# Patient Record
Sex: FEMALE | Race: WHITE | NOT HISPANIC OR LATINO | Employment: FULL TIME | URBAN - METROPOLITAN AREA
[De-identification: names, ages, dates, MRNs, and addresses within clinical notes are randomized per-mention and may not be internally consistent; named-entity substitution may affect disease eponyms.]

---

## 2017-07-31 ENCOUNTER — ALLSCRIPTS OFFICE VISIT (OUTPATIENT)
Dept: OTHER | Facility: OTHER | Age: 27
End: 2017-07-31

## 2017-07-31 DIAGNOSIS — J30.9 ALLERGIC RHINITIS: ICD-10-CM

## 2017-07-31 DIAGNOSIS — J45.40 MODERATE PERSISTENT ASTHMA, UNCOMPLICATED: ICD-10-CM

## 2017-07-31 DIAGNOSIS — Z00.00 ENCOUNTER FOR GENERAL ADULT MEDICAL EXAMINATION WITHOUT ABNORMAL FINDINGS: ICD-10-CM

## 2017-09-23 ENCOUNTER — OFFICE VISIT (OUTPATIENT)
Dept: URGENT CARE | Facility: CLINIC | Age: 27
End: 2017-09-23
Payer: COMMERCIAL

## 2017-09-23 ENCOUNTER — APPOINTMENT (OUTPATIENT)
Dept: LAB | Facility: HOSPITAL | Age: 27
End: 2017-09-23
Payer: COMMERCIAL

## 2017-09-23 DIAGNOSIS — J02.9 ACUTE PHARYNGITIS: ICD-10-CM

## 2017-09-23 PROCEDURE — 99213 OFFICE O/P EST LOW 20 MIN: CPT

## 2017-09-23 PROCEDURE — S9088 SERVICES PROVIDED IN URGENT: HCPCS

## 2017-09-23 PROCEDURE — 87070 CULTURE OTHR SPECIMN AEROBIC: CPT

## 2017-09-25 LAB — BACTERIA THROAT CULT: NORMAL

## 2018-01-14 VITALS
SYSTOLIC BLOOD PRESSURE: 100 MMHG | BODY MASS INDEX: 28.04 KG/M2 | DIASTOLIC BLOOD PRESSURE: 70 MMHG | HEIGHT: 63 IN | HEART RATE: 92 BPM | TEMPERATURE: 97.9 F | RESPIRATION RATE: 16 BRPM | OXYGEN SATURATION: 98 % | WEIGHT: 158.25 LBS

## 2018-04-02 ENCOUNTER — OFFICE VISIT (OUTPATIENT)
Dept: URGENT CARE | Facility: CLINIC | Age: 28
End: 2018-04-02
Payer: COMMERCIAL

## 2018-04-02 VITALS
HEART RATE: 87 BPM | DIASTOLIC BLOOD PRESSURE: 75 MMHG | TEMPERATURE: 98 F | RESPIRATION RATE: 18 BRPM | OXYGEN SATURATION: 97 % | SYSTOLIC BLOOD PRESSURE: 110 MMHG

## 2018-04-02 DIAGNOSIS — J01.00 ACUTE MAXILLARY SINUSITIS, RECURRENCE NOT SPECIFIED: Primary | ICD-10-CM

## 2018-04-02 PROCEDURE — 99213 OFFICE O/P EST LOW 20 MIN: CPT | Performed by: NURSE PRACTITIONER

## 2018-04-02 PROCEDURE — S9088 SERVICES PROVIDED IN URGENT: HCPCS | Performed by: NURSE PRACTITIONER

## 2018-04-02 RX ORDER — AMOXICILLIN AND CLAVULANATE POTASSIUM 875; 125 MG/1; MG/1
1 TABLET, FILM COATED ORAL EVERY 12 HOURS SCHEDULED
Qty: 20 TABLET | Refills: 0 | Status: SHIPPED | OUTPATIENT
Start: 2018-04-02 | End: 2018-04-12

## 2018-04-02 NOTE — PROGRESS NOTES
[facility]        NAME: Radha Justice is a 24 y o  female  : 1990    MRN: [id number]  DATE: 2018  TIME: 9:17 AM    Assessment and Plan   Acute maxillary sinusitis, recurrence not specified [J01 00]  1  Acute maxillary sinusitis, recurrence not specified  amoxicillin-clavulanate (AUGMENTIN) 875-125 mg per tablet         Patient Instructions     Patient Instructions   Discussed viral vs bacterial infection  RX medication as directed  Continue OTC cough/cold medications as directed  Call or return for problems/concerns  Work note given      Follow up with PCP in 3-5 days  Proceed to  ER if symptoms worsen  Chief Complaint     Chief Complaint   Patient presents with    Sore Throat     Pt c/o a sore throat sicne Thursday,          History of Present Illness       Sore throat and sinus congestion x 5 days, no fever, + cough, now getting worse  Sore Throat    Associated symptoms include congestion, coughing, ear pain and headaches  Pertinent negatives include no diarrhea or vomiting  Review of Systems   Review of Systems   Constitutional: Negative for activity change, chills, fatigue and fever  HENT: Positive for congestion, ear pain, postnasal drip, rhinorrhea, sinus pressure and sore throat  Eyes: Negative for pain, discharge and redness  Respiratory: Positive for cough  Negative for wheezing  Cardiovascular: Negative for chest pain  Gastrointestinal: Negative for constipation, diarrhea, nausea and vomiting  Musculoskeletal: Negative for myalgias  Skin: Negative for rash  Neurological: Positive for headaches  Negative for dizziness           Current Medications       Current Outpatient Prescriptions:     amoxicillin-clavulanate (AUGMENTIN) 875-125 mg per tablet, Take 1 tablet by mouth every 12 (twelve) hours for 10 days, Disp: 20 tablet, Rfl: 0    Current Allergies     Allergies as of 2018    (No Known Allergies)            The following portions of the patient's history were reviewed and updated as appropriate: allergies, current medications, past family history, past medical history, past social history, past surgical history and problem list      No past medical history on file  No past surgical history on file  No family history on file  Medications have been verified  Objective   /75   Pulse 87   Temp 98 °F (36 7 °C)   Resp 18   SpO2 97%        Physical Exam     Physical Exam   Constitutional: She is oriented to person, place, and time  She appears well-developed and well-nourished  No distress  HENT:   Head: Normocephalic and atraumatic  Right Ear: Tympanic membrane, external ear and ear canal normal    Left Ear: Tympanic membrane, external ear and ear canal normal    Nose: Rhinorrhea present  No epistaxis  Right sinus exhibits maxillary sinus tenderness  Left sinus exhibits maxillary sinus tenderness  Mouth/Throat: Uvula is midline, oropharynx is clear and moist and mucous membranes are normal    Eyes: Conjunctivae and EOM are normal  Right eye exhibits no discharge  Left eye exhibits no discharge  Cardiovascular: Normal rate, regular rhythm and normal heart sounds  Exam reveals no gallop and no friction rub  No murmur heard  Pulmonary/Chest: Effort normal and breath sounds normal  No respiratory distress (dry cough in room)  She has no wheezes  She has no rales  Abdominal: She exhibits no distension  Musculoskeletal: Normal range of motion  Neurological: She is alert and oriented to person, place, and time  Skin: She is not diaphoretic  Psychiatric: She has a normal mood and affect  Her behavior is normal  Judgment and thought content normal    Nursing note and vitals reviewed

## 2018-05-02 ENCOUNTER — OFFICE VISIT (OUTPATIENT)
Dept: URGENT CARE | Facility: MEDICAL CENTER | Age: 28
End: 2018-05-02
Payer: COMMERCIAL

## 2018-05-02 VITALS
OXYGEN SATURATION: 99 % | DIASTOLIC BLOOD PRESSURE: 83 MMHG | HEART RATE: 93 BPM | SYSTOLIC BLOOD PRESSURE: 125 MMHG | HEIGHT: 63 IN | TEMPERATURE: 97.8 F | RESPIRATION RATE: 16 BRPM | WEIGHT: 160 LBS | BODY MASS INDEX: 28.35 KG/M2

## 2018-05-02 DIAGNOSIS — K08.89 PAIN, DENTAL: Primary | ICD-10-CM

## 2018-05-02 DIAGNOSIS — K08.89 PAIN, DENTAL: ICD-10-CM

## 2018-05-02 PROCEDURE — S9088 SERVICES PROVIDED IN URGENT: HCPCS | Performed by: PHYSICIAN ASSISTANT

## 2018-05-02 PROCEDURE — 99213 OFFICE O/P EST LOW 20 MIN: CPT | Performed by: PHYSICIAN ASSISTANT

## 2018-05-02 RX ORDER — PENICILLIN V POTASSIUM 500 MG/1
500 TABLET ORAL EVERY 6 HOURS SCHEDULED
Qty: 28 TABLET | Refills: 0 | Status: SHIPPED | OUTPATIENT
Start: 2018-05-02 | End: 2018-05-02 | Stop reason: SDUPTHER

## 2018-05-02 RX ORDER — IBUPROFEN 600 MG/1
600 TABLET ORAL EVERY 6 HOURS PRN
Qty: 30 TABLET | Refills: 0 | Status: SHIPPED | OUTPATIENT
Start: 2018-05-02

## 2018-05-02 RX ORDER — PENICILLIN V POTASSIUM 500 MG/1
500 TABLET ORAL EVERY 6 HOURS SCHEDULED
Qty: 28 TABLET | Refills: 0 | Status: SHIPPED | OUTPATIENT
Start: 2018-05-02 | End: 2018-05-09

## 2018-05-02 RX ORDER — IBUPROFEN 600 MG/1
600 TABLET ORAL EVERY 6 HOURS PRN
Qty: 30 TABLET | Refills: 0 | Status: SHIPPED | OUTPATIENT
Start: 2018-05-02 | End: 2018-05-02 | Stop reason: SDUPTHER

## 2018-05-03 NOTE — PATIENT INSTRUCTIONS
Dental Abscess   WHAT YOU NEED TO KNOW:   What is a dental abscess? A dental abscess is a collection of pus in or around a tooth  A dental abscess is caused by bacteria  The bacteria usually enter the tooth when the enamel (outer part of the tooth) is damaged by tooth decay  Bacteria may also enter the tooth through a break or chip in the tooth, or a cut in the gum  Food particles that are stuck between the teeth for a long time may also lead to an abscess  What increases my risk for a dental abscess? · Poor tooth care    · Medical conditions, such as diabetes, gastric reflux, or diseases that weaken the immune system     · Procedures on the tooth or the gums    · Dry mouth or very little saliva     · Smoking or drinking alcohol    · Radiation therapy of the head and neck    · Certain medicines, such as steroids, allergy, or blood pressure medicines  What are the signs and symptoms of a dental abscess? · Toothache, a loose tooth, or a tooth that is very sensitive to pressure or temperature    · Bad breath, unpleasant taste, and drooling    · Fever    · Pain, redness, and swelling of the gums, or swelling of your face and neck    · Pain when you open or close your mouth    · Trouble opening your mouth  How is a dental abscess diagnosed? Your healthcare provider will examine your teeth and gums  He or she will check for pus, redness, swelling, or a mass  You may need an x-ray to check for infection in deeper tissues or broken teeth  How is a dental abscess treated? Treatment helps treat your abscess and prevent more serious problems  · Medicines  may be given to treat a bacterial infection and decrease pain  · Incision and drainage  is a cut in the abscess to allow the pus to drain  A sample of fluid may be collected from your abscess  The fluid is sent to a lab and tested for bacteria  Ask your healthcare provider for more information      · A root canal  is a procedure to remove the bacteria and prevent more infection  It is usually done after an incision and drainage  A filling or crown will be placed over the tooth after you have healed from your root canal      · Tooth removal  may be needed if the infection affects deeper tissues  This is usually done after an incision and drainage  What can I do to care for myself? · Rinse your mouth every 2 hours with salt water  This will help keep the area clean  · Gently brush your teeth twice a day with a soft tooth brush  This will help keep the area clean  · Eat soft foods as directed  Soft foods may cause less pain  Examples include applesauce, yogurt, and cooked pasta  Ask your healthcare provider how long to follow this instruction  · Apply a warm compress to your tooth or gum  Use a cotton ball or gauze soaked in warm water  Remove the compress in 10 minutes or when it becomes cool  Repeat 3 times a day  What can I do to prevent another dental abscess? · Brush your teeth at least 2 times a day with fluoride toothpaste  · Use dental floss to clean between your teeth at least once a day  · Rinse your mouth with water or mouthwash after meals and snacks  · Chew sugarless gum after meals and snacks  · Limit foods that are sticky and high in sugar such as raisons  Also limit drinks high in sugar, such as soda  · See your dentist every 6 months for dental cleanings and oral exams  When should I seek immediate care? · You have severe pain  · You have trouble breathing because of pain or swelling  When should I contact my healthcare provider? · Your symptoms get worse, even after treatment  · Your mouth is bleeding  · You cannot eat or drink because of pain or swelling  · Your abscess returns  · You have an injury that causes a crack in your tooth  · You have questions or concerns about your condition or care  CARE AGREEMENT:   You have the right to help plan your care   Learn about your health condition and how it may be treated  Discuss treatment options with your caregivers to decide what care you want to receive  You always have the right to refuse treatment  The above information is an  only  It is not intended as medical advice for individual conditions or treatments  Talk to your doctor, nurse or pharmacist before following any medical regimen to see if it is safe and effective for you  © 2017 [employer] Information is for End User's use only and may not be sold, redistributed or otherwise used for commercial purposes  All illustrations and images included in CareNotes® are the copyrighted property of [employer]  or [employer]

## 2020-10-25 NOTE — PROGRESS NOTES
[facility]        NAME: Ewelina Maher is a 24 y o  female  : 1990    MRN: [id number]  DATE: May 2, 2018  TIME: 8:44 PM    Assessment and Plan   Pain, dental [K08 89]  1  Pain, dental  penicillin V potassium (VEETID) 500 mg tablet    ibuprofen (MOTRIN) 600 mg tablet         Patient Instructions     Patient Instructions     Dental Abscess   WHAT YOU NEED TO KNOW:   What is a dental abscess? A dental abscess is a collection of pus in or around a tooth  A dental abscess is caused by bacteria  The bacteria usually enter the tooth when the enamel (outer part of the tooth) is damaged by tooth decay  Bacteria may also enter the tooth through a break or chip in the tooth, or a cut in the gum  Food particles that are stuck between the teeth for a long time may also lead to an abscess  What increases my risk for a dental abscess? · Poor tooth care    · Medical conditions, such as diabetes, gastric reflux, or diseases that weaken the immune system     · Procedures on the tooth or the gums    · Dry mouth or very little saliva     · Smoking or drinking alcohol    · Radiation therapy of the head and neck    · Certain medicines, such as steroids, allergy, or blood pressure medicines  What are the signs and symptoms of a dental abscess? · Toothache, a loose tooth, or a tooth that is very sensitive to pressure or temperature    · Bad breath, unpleasant taste, and drooling    · Fever    · Pain, redness, and swelling of the gums, or swelling of your face and neck    · Pain when you open or close your mouth    · Trouble opening your mouth  How is a dental abscess diagnosed? Your healthcare provider will examine your teeth and gums  He or she will check for pus, redness, swelling, or a mass  You may need an x-ray to check for infection in deeper tissues or broken teeth  How is a dental abscess treated? Treatment helps treat your abscess and prevent more serious problems    · Medicines  may be given to treat a bacterial infection and decrease pain  · Incision and drainage  is a cut in the abscess to allow the pus to drain  A sample of fluid may be collected from your abscess  The fluid is sent to a lab and tested for bacteria  Ask your healthcare provider for more information  · A root canal  is a procedure to remove the bacteria and prevent more infection  It is usually done after an incision and drainage  A filling or crown will be placed over the tooth after you have healed from your root canal      · Tooth removal  may be needed if the infection affects deeper tissues  This is usually done after an incision and drainage  What can I do to care for myself? · Rinse your mouth every 2 hours with salt water  This will help keep the area clean  · Gently brush your teeth twice a day with a soft tooth brush  This will help keep the area clean  · Eat soft foods as directed  Soft foods may cause less pain  Examples include applesauce, yogurt, and cooked pasta  Ask your healthcare provider how long to follow this instruction  · Apply a warm compress to your tooth or gum  Use a cotton ball or gauze soaked in warm water  Remove the compress in 10 minutes or when it becomes cool  Repeat 3 times a day  What can I do to prevent another dental abscess? · Brush your teeth at least 2 times a day with fluoride toothpaste  · Use dental floss to clean between your teeth at least once a day  · Rinse your mouth with water or mouthwash after meals and snacks  · Chew sugarless gum after meals and snacks  · Limit foods that are sticky and high in sugar such as raisons  Also limit drinks high in sugar, such as soda  · See your dentist every 6 months for dental cleanings and oral exams  When should I seek immediate care? · You have severe pain  · You have trouble breathing because of pain or swelling  When should I contact my healthcare provider?    · Your symptoms get worse, even after treatment  · Your mouth is bleeding  · You cannot eat or drink because of pain or swelling  · Your abscess returns  · You have an injury that causes a crack in your tooth  · You have questions or concerns about your condition or care  CARE AGREEMENT:   You have the right to help plan your care  Learn about your health condition and how it may be treated  Discuss treatment options with your caregivers to decide what care you want to receive  You always have the right to refuse treatment  The above information is an  only  It is not intended as medical advice for individual conditions or treatments  Talk to your doctor, nurse or pharmacist before following any medical regimen to see if it is safe and effective for you  © 2017 [employer]  Information is for End User's use only and may not be sold, redistributed or otherwise used for commercial purposes  All illustrations and images included in CareNotes® are the copyrighted property of [employer]  or [employer]  Chief Complaint     Chief Complaint   Patient presents with    Dental Pain     started today  History of Present Illness   Shay Mclaughlin presents to the clinic c/o      53-year-old female presents for evaluation of right-sided lower jaw pain and started today  Patient states she knows she has extensive cavity in that tooth, but only started with pain today  She denies any fevers, chills, shortness of breath, difficulty breathing  She did take Excedrin, which helped with the pain slightly  She denies any allergies to medications or antibiotics  Review of Systems   Review of Systems   Constitutional: Negative  HENT: Positive for dental problem  Respiratory: Negative  Cardiovascular: Negative            Current Medications     Long-Term Prescriptions   Medication Sig Dispense Refill    ibuprofen (MOTRIN) 600 mg tablet Take 1 tablet (600 mg total) by mouth every 6 (six) hours as needed for mild pain 30 tablet 0       Current Allergies     Allergies as of 05/02/2018    (No Known Allergies)            The following portions of the patient's history were reviewed and updated as appropriate: allergies, current medications, past family history, past medical history, past social history, past surgical history and problem list     Objective   /83   Pulse 93   Temp 97 8 °F (36 6 °C)   Resp 16   Ht 5' 3" (1 6 m)   Wt 72 6 kg (160 lb)   SpO2 99%   BMI 28 34 kg/m²        Physical Exam     Physical Exam   Constitutional: She appears well-developed and well-nourished  No distress  HENT:   Head: Normocephalic and atraumatic  Right Ear: External ear normal    Left Ear: External ear normal    Nose: Nose normal    Mouth/Throat: Oropharynx is clear and moist          Negative kera angina   Cardiovascular: Normal rate, regular rhythm and normal heart sounds  Exam reveals no gallop and no friction rub  No murmur heard  Pulmonary/Chest: Effort normal and breath sounds normal  No respiratory distress  She has no wheezes  She has no rales  Skin: She is not diaphoretic  Nursing note and vitals reviewed  normal for race